# Patient Record
Sex: FEMALE | Race: WHITE
[De-identification: names, ages, dates, MRNs, and addresses within clinical notes are randomized per-mention and may not be internally consistent; named-entity substitution may affect disease eponyms.]

---

## 2018-12-26 ENCOUNTER — HOSPITAL ENCOUNTER (OUTPATIENT)
Dept: HOSPITAL 95 - LAB | Age: 38
Discharge: HOME | End: 2018-12-26
Attending: OBSTETRICS & GYNECOLOGY
Payer: COMMERCIAL

## 2018-12-26 DIAGNOSIS — Z01.419: Primary | ICD-10-CM

## 2018-12-26 PROCEDURE — G0123 SCREEN CERV/VAG THIN LAYER: HCPCS

## 2018-12-28 LAB — OTHER STN SPEC: (no result)

## 2019-01-09 ENCOUNTER — HOSPITAL ENCOUNTER (OUTPATIENT)
Dept: HOSPITAL 95 - RAD | Age: 39
Discharge: HOME | End: 2019-01-09
Attending: OBSTETRICS & GYNECOLOGY
Payer: COMMERCIAL

## 2019-01-09 DIAGNOSIS — Z31.41: Primary | ICD-10-CM

## 2019-05-20 ENCOUNTER — HOSPITAL ENCOUNTER (OUTPATIENT)
Dept: HOSPITAL 95 - LAB | Age: 39
Discharge: HOME | End: 2019-05-20
Attending: ADVANCED PRACTICE MIDWIFE
Payer: COMMERCIAL

## 2019-05-20 DIAGNOSIS — Z11.3: Primary | ICD-10-CM

## 2019-05-22 ENCOUNTER — HOSPITAL ENCOUNTER (OUTPATIENT)
Dept: HOSPITAL 95 - LAB EV | Age: 39
Discharge: HOME | End: 2019-05-22
Attending: ADVANCED PRACTICE MIDWIFE
Payer: COMMERCIAL

## 2019-05-22 DIAGNOSIS — Z34.80: Primary | ICD-10-CM

## 2019-05-22 LAB — TSH SERPL DL<=0.005 MIU/L-ACNC: 6.54 UIU/ML

## 2021-07-10 NOTE — NUR
PT HAD 6 FAILED IV STARTS BY [facility] STAFF. SARAH BETH GARCIA FROM ER INSERTED 18G IV ON
PTS LEFT HAND AT APPROX [id number].

## 2021-07-10 NOTE — NUR
dc home, verbalized dc instructions, dr bynum went over dc instructions with
pt and gave hospital copy with nurse going over also. pt will return to dr bynum office on monday for US and then on friday for visit with reyna. pt
going to eat her breakfast  brought and then leave. she is dressed and
ready to go